# Patient Record
Sex: MALE | Race: WHITE | NOT HISPANIC OR LATINO | Employment: FULL TIME | URBAN - METROPOLITAN AREA
[De-identification: names, ages, dates, MRNs, and addresses within clinical notes are randomized per-mention and may not be internally consistent; named-entity substitution may affect disease eponyms.]

---

## 2023-09-09 ENCOUNTER — HOSPITAL ENCOUNTER (OUTPATIENT)
Dept: DATA CONVERSION | Facility: HOSPITAL | Age: 53
Discharge: HOME | End: 2023-09-09
Payer: COMMERCIAL

## 2023-09-09 DIAGNOSIS — I10 ESSENTIAL (PRIMARY) HYPERTENSION: ICD-10-CM

## 2023-09-09 LAB
ANION GAP SERPL CALCULATED.3IONS-SCNC: 11 MMOL/L (ref 0–19)
BILIRUB UR QL STRIP.AUTO: NEGATIVE
BUN SERPL-MCNC: 20 MG/DL (ref 8–25)
BUN/CREAT SERPL: 18.2 RATIO (ref 8–21)
CALCIUM SERPL-MCNC: 9.4 MG/DL (ref 8.5–10.4)
CHLORIDE SERPL-SCNC: 103 MMOL/L (ref 97–107)
CLARITY UR: CLEAR
CO2 SERPL-SCNC: 27 MMOL/L (ref 24–31)
COLOR UR: NORMAL
CREAT SERPL-MCNC: 1.1 MG/DL (ref 0.4–1.6)
GFR SERPL CREATININE-BSD FRML MDRD: 81 ML/MIN/1.73 M2
GLUCOSE SERPL-MCNC: 109 MG/DL (ref 65–99)
GLUCOSE UR STRIP.AUTO-MCNC: NEGATIVE MG/DL
HGB UR QL: NEGATIVE
KETONES UR QL STRIP.AUTO: NEGATIVE
LEUKOCYTE ESTERASE UR QL STRIP.AUTO: NEGATIVE
NITRITE UR QL STRIP.AUTO: NEGATIVE
PH UR STRIP.AUTO: 7 [PH] (ref 4.6–8)
POTASSIUM SERPL-SCNC: 4 MMOL/L (ref 3.4–5.1)
PROT UR STRIP.AUTO-MCNC: NEGATIVE MG/DL
REFLEX MICROSCOPIC (UA): NORMAL
SODIUM SERPL-SCNC: 141 MMOL/L (ref 133–145)
SP GR UR STRIP.AUTO: 1.01 (ref 1–1.03)
UROBILINOGEN UR QL STRIP.AUTO: NORMAL MG/DL (ref 0–1)

## 2023-11-01 PROBLEM — R73.9 BLOOD GLUCOSE ELEVATED: Status: ACTIVE | Noted: 2019-04-02

## 2023-11-01 PROBLEM — S83.412A SPRAIN OF MEDIAL COLLATERAL LIGAMENT OF LEFT KNEE: Status: ACTIVE | Noted: 2020-01-15

## 2023-11-01 PROBLEM — K42.9 UMBILICAL HERNIA WITHOUT OBSTRUCTION AND WITHOUT GANGRENE: Status: ACTIVE | Noted: 2023-11-01

## 2023-11-01 PROBLEM — R73.01 IMPAIRED FASTING GLUCOSE: Status: ACTIVE | Noted: 2019-04-02

## 2023-11-01 PROBLEM — K42.9 PERIUMBILICAL HERNIA: Status: ACTIVE | Noted: 2019-01-18

## 2023-11-01 PROBLEM — G47.33 OBSTRUCTIVE SLEEP APNEA: Status: ACTIVE | Noted: 2020-01-15

## 2023-11-01 PROBLEM — K43.9 VENTRAL HERNIA WITHOUT OBSTRUCTION OR GANGRENE: Status: ACTIVE | Noted: 2023-11-01

## 2023-11-01 PROBLEM — I05.9 MITRAL VALVE DISORDER: Status: ACTIVE | Noted: 2023-11-01

## 2023-11-01 PROBLEM — E11.9 DIABETES (MULTI): Status: ACTIVE | Noted: 2023-02-20

## 2023-11-01 PROBLEM — I10 ESSENTIAL (PRIMARY) HYPERTENSION: Status: ACTIVE | Noted: 2018-09-30

## 2023-11-01 RX ORDER — HYDROCHLOROTHIAZIDE 25 MG/1
25 TABLET ORAL DAILY
COMMUNITY
End: 2024-04-22

## 2023-11-01 RX ORDER — LISINOPRIL 10 MG/1
10 TABLET ORAL DAILY
COMMUNITY
End: 2024-01-18

## 2023-11-01 RX ORDER — NAPROXEN 500 MG/1
1 TABLET ORAL EVERY 12 HOURS
COMMUNITY
Start: 2019-09-28 | End: 2023-12-14 | Stop reason: ALTCHOICE

## 2023-11-02 ENCOUNTER — OFFICE VISIT (OUTPATIENT)
Dept: SURGERY | Facility: CLINIC | Age: 53
End: 2023-11-02
Payer: COMMERCIAL

## 2023-11-02 VITALS
HEART RATE: 93 BPM | BODY MASS INDEX: 23.62 KG/M2 | HEIGHT: 75 IN | SYSTOLIC BLOOD PRESSURE: 133 MMHG | DIASTOLIC BLOOD PRESSURE: 87 MMHG | WEIGHT: 190 LBS

## 2023-11-02 DIAGNOSIS — Z12.11 ENCOUNTER FOR SCREENING COLONOSCOPY: Primary | ICD-10-CM

## 2023-11-02 ASSESSMENT — LIFESTYLE VARIABLES
AUDIT-C TOTAL SCORE: 2
SKIP TO QUESTIONS 9-10: 1
HOW OFTEN DO YOU HAVE SIX OR MORE DRINKS ON ONE OCCASION: NEVER
HOW OFTEN DO YOU HAVE A DRINK CONTAINING ALCOHOL: 2-4 TIMES A MONTH
HOW MANY STANDARD DRINKS CONTAINING ALCOHOL DO YOU HAVE ON A TYPICAL DAY: 1 OR 2

## 2023-11-02 ASSESSMENT — PATIENT HEALTH QUESTIONNAIRE - PHQ9
1. LITTLE INTEREST OR PLEASURE IN DOING THINGS: NOT AT ALL
10. IF YOU CHECKED OFF ANY PROBLEMS, HOW DIFFICULT HAVE THESE PROBLEMS MADE IT FOR YOU TO DO YOUR WORK, TAKE CARE OF THINGS AT HOME, OR GET ALONG WITH OTHER PEOPLE: NOT DIFFICULT AT ALL
1. LITTLE INTEREST OR PLEASURE IN DOING THINGS: NOT AT ALL
SUM OF ALL RESPONSES TO PHQ9 QUESTIONS 1 & 2: 1
2. FEELING DOWN, DEPRESSED OR HOPELESS: NOT AT ALL
SUM OF ALL RESPONSES TO PHQ9 QUESTIONS 1 & 2: 0
2. FEELING DOWN, DEPRESSED OR HOPELESS: SEVERAL DAYS

## 2023-11-02 ASSESSMENT — ENCOUNTER SYMPTOMS
LOSS OF SENSATION IN FEET: 0
OCCASIONAL FEELINGS OF UNSTEADINESS: 0
DEPRESSION: 0

## 2023-11-02 ASSESSMENT — PAIN SCALES - GENERAL: PAINLEVEL: 0-NO PAIN

## 2023-11-02 NOTE — PROGRESS NOTES
"HPI presents today for a screening colonoscopy.   Denies:  any change in bowel habits, caliber of stool, abdominal pain or bloating, unintentional weight loss, blood in stool or on toilet tissue, family history of colon cancer or anticoagulant use.  He did have some bout of gastritis/colitis after a recent trip to [place]. No issues since his visit.     VITAL SIGNS:/87 (BP Location: Left arm, Patient Position: Sitting, BP Cuff Size: Adult)   Pulse 93   Ht 1.905 m (6' 3\")   Wt 86.2 kg (190 lb)   BMI 23.75 kg/m²     ALLERGIES: is allergic to seasonale (91) [levonorgestrel-ethinyl estrad].      ROS:  CONSTITUTIONAL: Negative for, fever, chills, fatigue, night sweats, weight gain, unexplained weight loss, loss of appetite, cold intolerance, heat intolerance. SKIN: Negative for: rash, changes in moles, hair loss. EYE: Negative for: change in vision, double vision, dry, red, itchy or watery eyes. HEENT: Negative for: headache, sore throat, ear pain, dental problems, tinnitus, hearing loss, trouble swallowing.  CARDIOLOGY: Negative for:  chest pain, palpitations, SOB. RESPIRATORY: Negative for: nausea, vomiting, SOB, dyspnea on exertion, cough, wheezing.  GI: Negative for:  nausea, vomiting, heartburn, melena, hematochezia, constipation, diarrhea.  UROLOGY: Negative for:  dysuria, hematuria, frequency, urgency. NEUROLOGY:  Negative for:  difficulty with balance, memory loss, confusion, seizures, paresthesias, numbness, weakness, tingling.  MUSCULOSKELETAL:  Negative for:  joint pain, joint swelling, back pain, leg pain.  ENDOCRINOLOGY:  Negative for:  thyroid disease, diabetes, fatigue, polyuria.  PSYCH:  Negative for:  hallucinations, jose roberto, depression, anxiety.        PHYSICAL EXAMINATION:    GENERAL:    General:  Appearance: well developed, well nourished.   NECK:   Findings: supple, no lymphadenopathy.  HEENT:  Head: normocephalic, atraumatic.  EYES:   Sclera: anicteric.  HEART:   Heart sounds: normal S1 " and S2. Murmurs: none.  LUNGS:  Auscultation: CTA bilaterally.  CHEST:   Chest wall: non tender, no masses.  ABDOMEN:   General: soft, bowel sounds: normoactive. Tenderness: none. Rebound tenderness: none. Hernia: absent.  MUSCULOSKELETAL:   No gross abnormalities.        Screening colonoscopy.   Prep given and all questions answered.  He will hold his BP medications the morning of his procedure.  He will hold all NAID use 5 days prior to his procedure.   +

## 2023-12-14 ENCOUNTER — HOSPITAL ENCOUNTER (OUTPATIENT)
Dept: GASTROENTEROLOGY | Facility: HOSPITAL | Age: 53
Discharge: HOME | End: 2023-12-14
Payer: COMMERCIAL

## 2023-12-14 VITALS
RESPIRATION RATE: 16 BRPM | HEIGHT: 74 IN | SYSTOLIC BLOOD PRESSURE: 109 MMHG | TEMPERATURE: 96.8 F | WEIGHT: 187.39 LBS | OXYGEN SATURATION: 99 % | HEART RATE: 59 BPM | BODY MASS INDEX: 24.05 KG/M2 | DIASTOLIC BLOOD PRESSURE: 66 MMHG

## 2023-12-14 DIAGNOSIS — Z12.11 ENCOUNTER FOR SCREENING COLONOSCOPY: ICD-10-CM

## 2023-12-14 PROCEDURE — 7100000010 HC PHASE TWO TIME - EACH INCREMENTAL 1 MINUTE

## 2023-12-14 PROCEDURE — 45378 DIAGNOSTIC COLONOSCOPY: CPT | Performed by: COLON & RECTAL SURGERY

## 2023-12-14 PROCEDURE — 2500000004 HC RX 250 GENERAL PHARMACY W/ HCPCS (ALT 636 FOR OP/ED): Performed by: COLON & RECTAL SURGERY

## 2023-12-14 PROCEDURE — 7100000009 HC PHASE TWO TIME - INITIAL BASE CHARGE

## 2023-12-14 PROCEDURE — 99152 MOD SED SAME PHYS/QHP 5/>YRS: CPT | Mod: 59 | Performed by: COLON & RECTAL SURGERY

## 2023-12-14 PROCEDURE — 99153 MOD SED SAME PHYS/QHP EA: CPT | Performed by: COLON & RECTAL SURGERY

## 2023-12-14 RX ORDER — LIDOCAINE HYDROCHLORIDE 10 MG/ML
0.1 INJECTION INFILTRATION; PERINEURAL ONCE
Status: DISCONTINUED | OUTPATIENT
Start: 2023-12-14 | End: 2023-12-14

## 2023-12-14 RX ORDER — LABETALOL HYDROCHLORIDE 5 MG/ML
10 INJECTION, SOLUTION INTRAVENOUS ONCE AS NEEDED
Status: DISCONTINUED | OUTPATIENT
Start: 2023-12-14 | End: 2023-12-14

## 2023-12-14 RX ORDER — MIDAZOLAM HYDROCHLORIDE 1 MG/ML
INJECTION, SOLUTION INTRAMUSCULAR; INTRAVENOUS AS NEEDED
Status: COMPLETED | OUTPATIENT
Start: 2023-12-14 | End: 2023-12-14

## 2023-12-14 RX ORDER — FAMOTIDINE 20 MG/1
20 TABLET, FILM COATED ORAL ONCE
Status: DISCONTINUED | OUTPATIENT
Start: 2023-12-14 | End: 2023-12-14

## 2023-12-14 RX ORDER — ALBUTEROL SULFATE 0.83 MG/ML
2.5 SOLUTION RESPIRATORY (INHALATION) ONCE AS NEEDED
Status: DISCONTINUED | OUTPATIENT
Start: 2023-12-14 | End: 2023-12-14

## 2023-12-14 RX ORDER — MEPERIDINE HYDROCHLORIDE 50 MG/ML
INJECTION INTRAMUSCULAR; INTRAVENOUS; SUBCUTANEOUS AS NEEDED
Status: COMPLETED | OUTPATIENT
Start: 2023-12-14 | End: 2023-12-14

## 2023-12-14 RX ORDER — ALBUTEROL SULFATE 0.83 MG/ML
2.5 SOLUTION RESPIRATORY (INHALATION) ONCE
Status: DISCONTINUED | OUTPATIENT
Start: 2023-12-14 | End: 2023-12-14

## 2023-12-14 RX ORDER — HYDRALAZINE HYDROCHLORIDE 20 MG/ML
10 INJECTION INTRAMUSCULAR; INTRAVENOUS EVERY 30 MIN PRN
Status: DISCONTINUED | OUTPATIENT
Start: 2023-12-14 | End: 2023-12-14

## 2023-12-14 RX ORDER — ONDANSETRON HYDROCHLORIDE 2 MG/ML
4 INJECTION, SOLUTION INTRAVENOUS ONCE AS NEEDED
Status: DISCONTINUED | OUTPATIENT
Start: 2023-12-14 | End: 2023-12-14

## 2023-12-14 RX ORDER — MEPERIDINE HYDROCHLORIDE 25 MG/ML
INJECTION INTRAMUSCULAR; INTRAVENOUS; SUBCUTANEOUS AS NEEDED
Status: COMPLETED | OUTPATIENT
Start: 2023-12-14 | End: 2023-12-14

## 2023-12-14 RX ORDER — DIPHENHYDRAMINE HYDROCHLORIDE 50 MG/ML
12.5 INJECTION INTRAMUSCULAR; INTRAVENOUS ONCE AS NEEDED
Status: DISCONTINUED | OUTPATIENT
Start: 2023-12-14 | End: 2023-12-14

## 2023-12-14 RX ORDER — METOCLOPRAMIDE 10 MG/1
10 TABLET ORAL ONCE
Status: DISCONTINUED | OUTPATIENT
Start: 2023-12-14 | End: 2023-12-14

## 2023-12-14 RX ADMIN — MIDAZOLAM 1 MG: 1 INJECTION INTRAMUSCULAR; INTRAVENOUS at 10:38

## 2023-12-14 RX ADMIN — MIDAZOLAM 1 MG: 1 INJECTION INTRAMUSCULAR; INTRAVENOUS at 10:03

## 2023-12-14 RX ADMIN — MIDAZOLAM 2 MG: 1 INJECTION INTRAMUSCULAR; INTRAVENOUS at 09:56

## 2023-12-14 RX ADMIN — MEPERIDINE HYDROCHLORIDE 25 MG: 25 INJECTION INTRAMUSCULAR; INTRAVENOUS; SUBCUTANEOUS at 10:12

## 2023-12-14 RX ADMIN — MEPERIDINE HYDROCHLORIDE 50 MG: 50 INJECTION INTRAMUSCULAR; INTRAVENOUS; SUBCUTANEOUS at 09:56

## 2023-12-14 ASSESSMENT — PAIN SCALES - GENERAL
PAINLEVEL_OUTOF10: 0 - NO PAIN

## 2023-12-14 ASSESSMENT — PAIN - FUNCTIONAL ASSESSMENT
PAIN_FUNCTIONAL_ASSESSMENT: 0-10

## 2023-12-14 NOTE — POST-PROCEDURE NOTE
Patient brought over from ENDO via cart and RN. Patient awake, denies pain, belly soft and nondistended. Patient given cookies and coffee, wife called to pick him up out at the main entrance.

## 2023-12-14 NOTE — H&P
"History Of Present Illness  Kevon Gonzales is a 53 y.o. male presenting with for his first screening colonoscopy.  He has no symptoms.  See note of 11/2/2023     Past Medical History  Past Medical History:   Diagnosis Date    Hypertension     Sleep apnea        Surgical History  Past Surgical History:   Procedure Laterality Date    NOSE SURGERY          Social History  He reports that he has never smoked. He has never used smokeless tobacco. He reports current alcohol use. He reports that he does not use drugs.    Family History  Family History   Problem Relation Name Age of Onset    Heart disease Mother      Mental illness Father      Heart disease Mother's Brother      Prostate cancer Mother's Brother      Cancer Maternal Grandmother      Heart attack Maternal Grandfather          Allergies  Seasonale (91) [levonorgestrel-ethinyl estrad]    Review of Systems     Physical Exam     Last Recorded Vitals  Blood pressure 117/88, pulse 82, temperature 36.2 °C (97.2 °F), temperature source Temporal, resp. rate 16, height 1.88 m (6' 2\"), weight 85 kg (187 lb 6.3 oz), SpO2 99 %.    Relevant Results        None     Assessment/Plan   Active Problems:  There are no active Hospital Problems.      Screening colonoscopy informed consent obtained       I spent 10 minutes in the professional and overall care of this patient.      Corbin Jones MD    "

## 2023-12-14 NOTE — POST-PROCEDURE NOTE
Discharge instructions reviewed with patient, he verbalizes understanding. Tolerating PO well. Patient is having another cup of coffee then will get dressed.

## 2024-01-18 DIAGNOSIS — I10 ESSENTIAL (PRIMARY) HYPERTENSION: ICD-10-CM

## 2024-01-18 RX ORDER — LISINOPRIL 10 MG/1
10 TABLET ORAL DAILY
Qty: 90 TABLET | Refills: 2 | Status: SHIPPED | OUTPATIENT
Start: 2024-01-18

## 2024-02-21 ASSESSMENT — PROMIS GLOBAL HEALTH SCALE
RATE_GENERAL_HEALTH: GOOD
RATE_QUALITY_OF_LIFE: GOOD
RATE_PHYSICAL_HEALTH: GOOD
CARRYOUT_SOCIAL_ACTIVITIES: VERY GOOD
CARRYOUT_PHYSICAL_ACTIVITIES: COMPLETELY
RATE_AVERAGE_FATIGUE: MODERATE
RATE_SOCIAL_SATISFACTION: GOOD
EMOTIONAL_PROBLEMS: SOMETIMES
RATE_AVERAGE_PAIN: 2
RATE_MENTAL_HEALTH: GOOD

## 2024-02-22 ENCOUNTER — OFFICE VISIT (OUTPATIENT)
Dept: PRIMARY CARE | Facility: CLINIC | Age: 54
End: 2024-02-22
Payer: COMMERCIAL

## 2024-02-22 ENCOUNTER — TELEPHONE (OUTPATIENT)
Dept: PRIMARY CARE | Facility: CLINIC | Age: 54
End: 2024-02-22

## 2024-02-22 VITALS
OXYGEN SATURATION: 96 % | WEIGHT: 180 LBS | SYSTOLIC BLOOD PRESSURE: 110 MMHG | BODY MASS INDEX: 23.1 KG/M2 | HEART RATE: 52 BPM | RESPIRATION RATE: 16 BRPM | DIASTOLIC BLOOD PRESSURE: 64 MMHG | HEIGHT: 74 IN

## 2024-02-22 DIAGNOSIS — Z00.00 WELL ADULT EXAM: ICD-10-CM

## 2024-02-22 DIAGNOSIS — Z00.00 ANNUAL PHYSICAL EXAM: ICD-10-CM

## 2024-02-22 DIAGNOSIS — Z79.4 TYPE 2 DIABETES MELLITUS WITHOUT COMPLICATION, WITH LONG-TERM CURRENT USE OF INSULIN (MULTI): ICD-10-CM

## 2024-02-22 DIAGNOSIS — R73.01 IMPAIRED FASTING GLUCOSE: Primary | ICD-10-CM

## 2024-02-22 DIAGNOSIS — E11.9 TYPE 2 DIABETES MELLITUS WITHOUT COMPLICATION, WITH LONG-TERM CURRENT USE OF INSULIN (MULTI): ICD-10-CM

## 2024-02-22 DIAGNOSIS — I10 ESSENTIAL (PRIMARY) HYPERTENSION: ICD-10-CM

## 2024-02-22 DIAGNOSIS — G47.33 OBSTRUCTIVE SLEEP APNEA: ICD-10-CM

## 2024-02-22 PROCEDURE — 3078F DIAST BP <80 MM HG: CPT | Performed by: FAMILY MEDICINE

## 2024-02-22 PROCEDURE — 3074F SYST BP LT 130 MM HG: CPT | Performed by: FAMILY MEDICINE

## 2024-02-22 PROCEDURE — 1036F TOBACCO NON-USER: CPT | Performed by: FAMILY MEDICINE

## 2024-02-22 PROCEDURE — 99396 PREV VISIT EST AGE 40-64: CPT | Performed by: FAMILY MEDICINE

## 2024-02-22 PROCEDURE — 4010F ACE/ARB THERAPY RXD/TAKEN: CPT | Performed by: FAMILY MEDICINE

## 2024-02-22 PROCEDURE — 99212 OFFICE O/P EST SF 10 MIN: CPT | Performed by: FAMILY MEDICINE

## 2024-02-22 ASSESSMENT — PAIN SCALES - GENERAL: PAINLEVEL: 0-NO PAIN

## 2024-02-22 ASSESSMENT — PATIENT HEALTH QUESTIONNAIRE - PHQ9
SUM OF ALL RESPONSES TO PHQ9 QUESTIONS 1 AND 2: 0
1. LITTLE INTEREST OR PLEASURE IN DOING THINGS: NOT AT ALL
2. FEELING DOWN, DEPRESSED OR HOPELESS: NOT AT ALL

## 2024-02-22 ASSESSMENT — ENCOUNTER SYMPTOMS
DEPRESSION: 0
OCCASIONAL FEELINGS OF UNSTEADINESS: 0
LOSS OF SENSATION IN FEET: 0

## 2024-02-22 NOTE — ASSESSMENT & PLAN NOTE
Will get fasting blood work.  He is to cut back on carbohydrates in his diet.  If blood work stable recheck in 6 months.

## 2024-02-22 NOTE — PROGRESS NOTES
"Subjective   Patient ID: Jean Carlos Gonzales is a 53 y.o. male who presents for Annual Exam.    HPI   1. JEAN CARLOS is seen for for his comprehensive physical exam. PMH, PSH, family history and social history were reviewed and updated.  He moved to [place] from [place] in 2/17 with his wife and  son. He is originally from [place]   Colonoscopy in 12/23 by Dr. Gandhi was normal.     2. JEAN CARLOS is here also for follow up of HTN.  He is on lisinopril 10 mg and HCTZ 25 mg.  HTN does not run in his family as far as he knows. He follows a good diet, reduces his salt intake and exercises intermittently. He had a cardiology workup by Dr. Darden in 7/19  for a  slightly abnormal EKG when being evaluated preoperatively.  His cardiac workup was negative.      3. He is also here for follow-up of obstructive sleep apnea.  He was diagnosed with this in 2020. He is now using a CPAP machine.Uses it every night and finds to be beneficial in terms of having him sleep better and feels less fatigued during the day.      4.   He is also here for follow-up of impaired fasting glucose.  He is on no medication.     Review of Systems  Denies headache, blurred vision, chest pain, shortness of breath, nausea or vomiting, change in bowel habits or leg pain or swelling.    Objective   /64 (BP Location: Left arm, Patient Position: Sitting, BP Cuff Size: Large adult)   Pulse 52   Resp 16   Ht 1.88 m (6' 2\")   Wt 81.6 kg (180 lb)   SpO2 96%   BMI 23.11 kg/m²     Physical Exam  General appearance: Vital signs have been reviewed.  Comfortable.  Well-nourished and well-developed.He is alert and oriented x3 and appears his stated age.The patient is cooperative with exam.  Head: Hair pattern reveals a normal pattern for patient's age and face shows no abnormalities.  Eyes: PERRLA x2, EOMI x2, conjunctive a and sclera are clear.  Ears: External bilateral ears with normal helix, tragus and earlobe.Bilateral canals are normal.Bilateral tympanic " membranes are pearly gray and landmarks are well visualized.  Nose: Nasal mucosa both nostrils reveals no polyps, ulcerations or lesions.  Throat:Teeth are in good repair.  Posterior pharynx reveals no abnormalities.  Neck: Supple without lymphadenopathy, thyromegaly or carotid bruits.  Lungs:Clear to auscultation bilaterally with no wheezes, rales or rhonchi.  Cardiovascular: RRR without MRG.No carotid bruits.  Extremities without edema and pulses are intact.  Abdomen: Soft, NT, no masses, no hepatosplenomegaly.  Genitalia: No testicular masses.  No evidence of renal hernia.Nontender.  Rectal: No masses.  Prostate is normal in size and shape with no nodules. It is nontender.  Musculoskeletal: 5/5 and equal strength in bilateral upper and lower extremities.  Skin: Good turgor and without rashes.  Neurological: Good overall strength and no focal deficits.  Cranial nerves II through XII are grossly intact.  Psychiatric: Patient has appropriate judgment with good insight.  Mood is appropriate.    Assessment/Plan   Problem List Items Addressed This Visit             ICD-10-CM       High    Essential (primary) hypertension I10     Continue lisinopril and hydrochlorothiazide.  Will get blood work and I will notify him results.  If stable return in 6 months for recheck.         Relevant Orders    Albumin , Urine Random    CBC and Auto Differential    Comprehensive Metabolic Panel    Lipid Panel    Urinalysis with Reflex Microscopic    Impaired fasting glucose - Primary R73.01     Will get fasting blood work.  He is to cut back on carbohydrates in his diet.  If blood work stable recheck in 6 months.         Obstructive sleep apnea G47.33     Continue using CPAP.         Well adult exam Z00.00     Anticipate guidance given.            Medium    Diabetes (CMS/HCC) E11.9    Relevant Orders    Albumin , Urine Random    CBC and Auto Differential    Comprehensive Metabolic Panel    Hemoglobin A1C    Lipid Panel    Urinalysis with  Reflex Microscopic     Other Visit Diagnoses         Codes    Annual physical exam     Z00.00    Relevant Orders    Albumin , Urine Random    CBC and Auto Differential    Comprehensive Metabolic Panel    Lipid Panel    Urinalysis with Reflex Microscopic

## 2024-02-22 NOTE — ASSESSMENT & PLAN NOTE
Continue lisinopril and hydrochlorothiazide.  Will get blood work and I will notify him results.  If stable return in 6 months for recheck.

## 2024-03-02 ENCOUNTER — LAB (OUTPATIENT)
Dept: LAB | Facility: LAB | Age: 54
End: 2024-03-02
Payer: COMMERCIAL

## 2024-03-02 DIAGNOSIS — I10 ESSENTIAL (PRIMARY) HYPERTENSION: ICD-10-CM

## 2024-03-02 DIAGNOSIS — E11.9 TYPE 2 DIABETES MELLITUS WITHOUT COMPLICATION, WITH LONG-TERM CURRENT USE OF INSULIN (MULTI): ICD-10-CM

## 2024-03-02 DIAGNOSIS — Z00.00 ANNUAL PHYSICAL EXAM: ICD-10-CM

## 2024-03-02 DIAGNOSIS — Z79.4 TYPE 2 DIABETES MELLITUS WITHOUT COMPLICATION, WITH LONG-TERM CURRENT USE OF INSULIN (MULTI): ICD-10-CM

## 2024-03-02 LAB
ALBUMIN SERPL-MCNC: 4.4 G/DL (ref 3.5–5)
ALP BLD-CCNC: 44 U/L (ref 35–125)
ALT SERPL-CCNC: 22 U/L (ref 5–40)
ANION GAP SERPL CALC-SCNC: 12 MMOL/L
APPEARANCE UR: CLEAR
AST SERPL-CCNC: 17 U/L (ref 5–40)
BASOPHILS # BLD AUTO: 0.02 X10*3/UL (ref 0–0.1)
BASOPHILS NFR BLD AUTO: 0.5 %
BILIRUB SERPL-MCNC: 0.3 MG/DL (ref 0.1–1.2)
BILIRUB UR STRIP.AUTO-MCNC: NEGATIVE MG/DL
BUN SERPL-MCNC: 20 MG/DL (ref 8–25)
CALCIUM SERPL-MCNC: 9.5 MG/DL (ref 8.5–10.4)
CHLORIDE SERPL-SCNC: 104 MMOL/L (ref 97–107)
CHOLEST SERPL-MCNC: 172 MG/DL (ref 133–200)
CHOLEST/HDLC SERPL: 4 {RATIO}
CO2 SERPL-SCNC: 27 MMOL/L (ref 24–31)
COLOR UR: NORMAL
CREAT SERPL-MCNC: 1 MG/DL (ref 0.4–1.6)
CREAT UR-MCNC: 120.3 MG/DL
EGFRCR SERPLBLD CKD-EPI 2021: >90 ML/MIN/1.73M*2
EOSINOPHIL # BLD AUTO: 0.07 X10*3/UL (ref 0–0.7)
EOSINOPHIL NFR BLD AUTO: 1.6 %
ERYTHROCYTE [DISTWIDTH] IN BLOOD BY AUTOMATED COUNT: 13.2 % (ref 11.5–14.5)
EST. AVERAGE GLUCOSE BLD GHB EST-MCNC: 114 MG/DL
GLUCOSE SERPL-MCNC: 103 MG/DL (ref 65–99)
GLUCOSE UR STRIP.AUTO-MCNC: NORMAL MG/DL
HBA1C MFR BLD: 5.6 %
HCT VFR BLD AUTO: 43.9 % (ref 41–52)
HDLC SERPL-MCNC: 43 MG/DL
HGB BLD-MCNC: 14.6 G/DL (ref 13.5–17.5)
IMM GRANULOCYTES # BLD AUTO: 0 X10*3/UL (ref 0–0.7)
IMM GRANULOCYTES NFR BLD AUTO: 0 % (ref 0–0.9)
KETONES UR STRIP.AUTO-MCNC: NEGATIVE MG/DL
LDLC SERPL CALC-MCNC: 114 MG/DL (ref 65–130)
LEUKOCYTE ESTERASE UR QL STRIP.AUTO: NEGATIVE
LYMPHOCYTES # BLD AUTO: 2.11 X10*3/UL (ref 1.2–4.8)
LYMPHOCYTES NFR BLD AUTO: 47.8 %
MCH RBC QN AUTO: 28.3 PG (ref 26–34)
MCHC RBC AUTO-ENTMCNC: 33.3 G/DL (ref 32–36)
MCV RBC AUTO: 85 FL (ref 80–100)
MICROALBUMIN UR-MCNC: <12 MG/L (ref 0–23)
MICROALBUMIN/CREAT UR: NORMAL MG/G{CREAT}
MONOCYTES # BLD AUTO: 0.34 X10*3/UL (ref 0.1–1)
MONOCYTES NFR BLD AUTO: 7.7 %
NEUTROPHILS # BLD AUTO: 1.87 X10*3/UL (ref 1.2–7.7)
NEUTROPHILS NFR BLD AUTO: 42.4 %
NITRITE UR QL STRIP.AUTO: NEGATIVE
NRBC BLD-RTO: 0 /100 WBCS (ref 0–0)
PH UR STRIP.AUTO: 5.5 [PH]
PLATELET # BLD AUTO: 229 X10*3/UL (ref 150–450)
POTASSIUM SERPL-SCNC: 4.1 MMOL/L (ref 3.4–5.1)
PROT SERPL-MCNC: 6.7 G/DL (ref 5.9–7.9)
PROT UR STRIP.AUTO-MCNC: NEGATIVE MG/DL
RBC # BLD AUTO: 5.16 X10*6/UL (ref 4.5–5.9)
RBC # UR STRIP.AUTO: NEGATIVE /UL
SODIUM SERPL-SCNC: 143 MMOL/L (ref 133–145)
SP GR UR STRIP.AUTO: 1.02
TRIGL SERPL-MCNC: 76 MG/DL (ref 40–150)
UROBILINOGEN UR STRIP.AUTO-MCNC: NORMAL MG/DL
WBC # BLD AUTO: 4.4 X10*3/UL (ref 4.4–11.3)

## 2024-03-02 PROCEDURE — 81003 URINALYSIS AUTO W/O SCOPE: CPT

## 2024-03-02 PROCEDURE — 80061 LIPID PANEL: CPT

## 2024-03-02 PROCEDURE — 82043 UR ALBUMIN QUANTITATIVE: CPT

## 2024-03-02 PROCEDURE — 36415 COLL VENOUS BLD VENIPUNCTURE: CPT

## 2024-03-02 PROCEDURE — 82570 ASSAY OF URINE CREATININE: CPT

## 2024-03-02 PROCEDURE — 85025 COMPLETE CBC W/AUTO DIFF WBC: CPT

## 2024-03-02 PROCEDURE — 80053 COMPREHEN METABOLIC PANEL: CPT

## 2024-03-02 PROCEDURE — 83036 HEMOGLOBIN GLYCOSYLATED A1C: CPT

## 2024-04-20 DIAGNOSIS — I10 ESSENTIAL (PRIMARY) HYPERTENSION: ICD-10-CM

## 2024-04-22 RX ORDER — HYDROCHLOROTHIAZIDE 25 MG/1
25 TABLET ORAL DAILY
Qty: 90 TABLET | Refills: 3 | Status: SHIPPED | OUTPATIENT
Start: 2024-04-22

## 2024-08-20 ENCOUNTER — APPOINTMENT (OUTPATIENT)
Dept: PRIMARY CARE | Facility: CLINIC | Age: 54
End: 2024-08-20
Payer: COMMERCIAL

## 2024-08-20 ENCOUNTER — TELEPHONE (OUTPATIENT)
Dept: PRIMARY CARE | Facility: CLINIC | Age: 54
End: 2024-08-20

## 2024-08-20 VITALS
RESPIRATION RATE: 16 BRPM | HEART RATE: 62 BPM | BODY MASS INDEX: 25.29 KG/M2 | OXYGEN SATURATION: 98 % | SYSTOLIC BLOOD PRESSURE: 118 MMHG | WEIGHT: 197 LBS | DIASTOLIC BLOOD PRESSURE: 72 MMHG

## 2024-08-20 DIAGNOSIS — R73.01 IMPAIRED FASTING GLUCOSE: ICD-10-CM

## 2024-08-20 DIAGNOSIS — Z79.4 TYPE 2 DIABETES MELLITUS WITHOUT COMPLICATION, WITH LONG-TERM CURRENT USE OF INSULIN (MULTI): ICD-10-CM

## 2024-08-20 DIAGNOSIS — I10 ESSENTIAL (PRIMARY) HYPERTENSION: ICD-10-CM

## 2024-08-20 DIAGNOSIS — Z00.00 ANNUAL PHYSICAL EXAM: Primary | ICD-10-CM

## 2024-08-20 DIAGNOSIS — G47.33 OBSTRUCTIVE SLEEP APNEA: Primary | ICD-10-CM

## 2024-08-20 DIAGNOSIS — E11.9 TYPE 2 DIABETES MELLITUS WITHOUT COMPLICATION, WITH LONG-TERM CURRENT USE OF INSULIN (MULTI): ICD-10-CM

## 2024-08-20 PROCEDURE — 4010F ACE/ARB THERAPY RXD/TAKEN: CPT | Performed by: FAMILY MEDICINE

## 2024-08-20 PROCEDURE — 1036F TOBACCO NON-USER: CPT | Performed by: FAMILY MEDICINE

## 2024-08-20 PROCEDURE — 3062F POS MACROALBUMINURIA REV: CPT | Performed by: FAMILY MEDICINE

## 2024-08-20 PROCEDURE — 99213 OFFICE O/P EST LOW 20 MIN: CPT | Performed by: FAMILY MEDICINE

## 2024-08-20 PROCEDURE — 3049F LDL-C 100-129 MG/DL: CPT | Performed by: FAMILY MEDICINE

## 2024-08-20 PROCEDURE — 3078F DIAST BP <80 MM HG: CPT | Performed by: FAMILY MEDICINE

## 2024-08-20 PROCEDURE — 3044F HG A1C LEVEL LT 7.0%: CPT | Performed by: FAMILY MEDICINE

## 2024-08-20 PROCEDURE — 3074F SYST BP LT 130 MM HG: CPT | Performed by: FAMILY MEDICINE

## 2024-08-20 ASSESSMENT — PAIN SCALES - GENERAL: PAINLEVEL: 0-NO PAIN

## 2024-08-20 ASSESSMENT — PATIENT HEALTH QUESTIONNAIRE - PHQ9
2. FEELING DOWN, DEPRESSED OR HOPELESS: NOT AT ALL
SUM OF ALL RESPONSES TO PHQ9 QUESTIONS 1 AND 2: 0
1. LITTLE INTEREST OR PLEASURE IN DOING THINGS: NOT AT ALL

## 2024-08-20 ASSESSMENT — ENCOUNTER SYMPTOMS
DEPRESSION: 0
LOSS OF SENSATION IN FEET: 0
OCCASIONAL FEELINGS OF UNSTEADINESS: 0

## 2024-08-20 NOTE — ASSESSMENT & PLAN NOTE
Continue lisinopril daily.  Continue exercise and cutting back on salt in diet.  Recheck in 6 months at [facility].

## 2024-08-20 NOTE — PROGRESS NOTES
Subjective   Patient ID: Jean Carlos Gonzales is a 54 y.o. male who presents for Hypertension (Patient is here for a HTN check).    HPI   1. JEAN CARLOS is here for follow up of HTN.  He is on lisinopril 10 mg and HCTZ 25 mg.  HTN does not run in his family as far as he knows. He follows a good diet, reduces his salt intake and exercises intermittently. He had a cardiology workup by Dr. Darden in 7/19  for a  slightly abnormal EKG when being evaluated preoperatively.  His cardiac workup was negative.      2. He is also here for follow-up of obstructive sleep apnea.  He was diagnosed with this in 2020. He is now using a CPAP machine.Uses it every night and finds to be beneficial in terms of having him sleep better and feels less fatigued during the day.      3.   He is also here for follow-up of impaired fasting glucose.  He is on no medication  Review of Systems  Denies headache, blurred vision, chest pain, shortness of breath, nausea or vomiting, change in bowel habits or leg pain or swelling.    Objective   /72 (BP Location: Left arm, Patient Position: Sitting, BP Cuff Size: Large adult)   Pulse 62   Resp 16   Wt 89.4 kg (197 lb)   SpO2 98%   BMI 25.29 kg/m²     Physical Exam  Vitals and nurse's notes reviewed  General: no acute distress  HEENT: Normal  Neck: Supple  Lungs: Clear  Cardio: RRR w/o murmur  Extremities: No edema, no calf tenderness  Neuro: Alert and oriented with no focal deficits    Assessment/Plan   Problem List Items Addressed This Visit             ICD-10-CM       High    Essential (primary) hypertension I10     Continue lisinopril daily.  Continue exercise and cutting back on salt in diet.  Recheck in 6 months at [facility].         Relevant Orders    Basic metabolic panel    Impaired fasting glucose R73.01     Will return for fasting blood work and A1c and I will notify him results.  If stable keep off medication and recheck in 6 months at [facility].         Relevant Orders    Hemoglobin A1c    Obstructive  sleep apnea - Primary G47.33     Continue CPAP therapy.  Recheck in 6 months at [facility].

## 2024-08-20 NOTE — ASSESSMENT & PLAN NOTE
Will return for fasting blood work and A1c and I will notify him results.  If stable keep off medication and recheck in 6 months at [facility].

## 2024-10-16 DIAGNOSIS — I10 ESSENTIAL (PRIMARY) HYPERTENSION: ICD-10-CM

## 2024-10-16 RX ORDER — LISINOPRIL 10 MG/1
10 TABLET ORAL DAILY
Qty: 90 TABLET | Refills: 2 | Status: SHIPPED | OUTPATIENT
Start: 2024-10-16

## 2025-03-29 ASSESSMENT — PROMIS GLOBAL HEALTH SCALE
RATE_QUALITY_OF_LIFE: GOOD
RATE_AVERAGE_FATIGUE: MODERATE
CARRYOUT_PHYSICAL_ACTIVITIES: COMPLETELY
RATE_SOCIAL_SATISFACTION: FAIR
EMOTIONAL_PROBLEMS: SOMETIMES
RATE_AVERAGE_PAIN: 1
RATE_PHYSICAL_HEALTH: GOOD
RATE_MENTAL_HEALTH: GOOD
RATE_GENERAL_HEALTH: GOOD
CARRYOUT_SOCIAL_ACTIVITIES: GOOD

## 2025-04-01 ENCOUNTER — TELEPHONE (OUTPATIENT)
Dept: PRIMARY CARE | Facility: CLINIC | Age: 55
End: 2025-04-01

## 2025-04-01 ENCOUNTER — APPOINTMENT (OUTPATIENT)
Dept: PRIMARY CARE | Facility: CLINIC | Age: 55
End: 2025-04-01
Payer: COMMERCIAL

## 2025-04-01 VITALS
BODY MASS INDEX: 24.37 KG/M2 | WEIGHT: 196 LBS | HEART RATE: 67 BPM | RESPIRATION RATE: 18 BRPM | OXYGEN SATURATION: 99 % | DIASTOLIC BLOOD PRESSURE: 74 MMHG | SYSTOLIC BLOOD PRESSURE: 130 MMHG | HEIGHT: 75 IN

## 2025-04-01 DIAGNOSIS — I10 ESSENTIAL (PRIMARY) HYPERTENSION: ICD-10-CM

## 2025-04-01 DIAGNOSIS — I10 ESSENTIAL (PRIMARY) HYPERTENSION: Primary | ICD-10-CM

## 2025-04-01 DIAGNOSIS — Z00.00 WELL ADULT EXAM: ICD-10-CM

## 2025-04-01 DIAGNOSIS — G47.33 OBSTRUCTIVE SLEEP APNEA: ICD-10-CM

## 2025-04-01 PROBLEM — R73.01 IMPAIRED FASTING GLUCOSE: Status: RESOLVED | Noted: 2019-04-02 | Resolved: 2025-04-01

## 2025-04-01 PROBLEM — R73.9 BLOOD GLUCOSE ELEVATED: Status: RESOLVED | Noted: 2019-04-02 | Resolved: 2025-04-01

## 2025-04-01 PROBLEM — E11.9 DIABETES (MULTI): Status: RESOLVED | Noted: 2023-02-20 | Resolved: 2025-04-01

## 2025-04-01 PROCEDURE — 99396 PREV VISIT EST AGE 40-64: CPT | Performed by: FAMILY MEDICINE

## 2025-04-01 PROCEDURE — 1036F TOBACCO NON-USER: CPT | Performed by: FAMILY MEDICINE

## 2025-04-01 PROCEDURE — 3078F DIAST BP <80 MM HG: CPT | Performed by: FAMILY MEDICINE

## 2025-04-01 PROCEDURE — 3075F SYST BP GE 130 - 139MM HG: CPT | Performed by: FAMILY MEDICINE

## 2025-04-01 PROCEDURE — 4010F ACE/ARB THERAPY RXD/TAKEN: CPT | Performed by: FAMILY MEDICINE

## 2025-04-01 PROCEDURE — 3008F BODY MASS INDEX DOCD: CPT | Performed by: FAMILY MEDICINE

## 2025-04-01 ASSESSMENT — PAIN SCALES - GENERAL: PAINLEVEL_OUTOF10: 0-NO PAIN

## 2025-04-01 ASSESSMENT — PATIENT HEALTH QUESTIONNAIRE - PHQ9
1. LITTLE INTEREST OR PLEASURE IN DOING THINGS: NOT AT ALL
2. FEELING DOWN, DEPRESSED OR HOPELESS: NOT AT ALL
SUM OF ALL RESPONSES TO PHQ9 QUESTIONS 1 AND 2: 0

## 2025-04-01 NOTE — ASSESSMENT & PLAN NOTE
Anticipatory guidance given.  He has had inguinal hernia symptoms over the past few years and now he is ready for referral.  He is going to call me when he wants to get this sent in.  Will refer to either Dr. Isabel or  Dr. Power.

## 2025-04-01 NOTE — ASSESSMENT & PLAN NOTE
Continue lisinopril and hydrochlorothiazide.  Will return for fasting [facility] blood work including CMP.  If stable continue current medication recheck with me in 6 months.

## 2025-04-01 NOTE — PROGRESS NOTES
"Subjective   Patient ID: Jean Carlos Gonzales is a 54 y.o. male who presents for Annual Exam (Patient is here for his annual wellness exam, no blood work done. Patient refused the pneumococcal vaccine ).    HPI   1. JEAN CARLOS is seen for for his comprehensive physical exam. PMH, PSH, family history and social history were reviewed and updated.  He moved to [place] from [place] in 2/17 with his wife and  son. He is originally from [place]   Colonoscopy in 12/23 by Dr. Gandhi was normal.     2. JEAN CARLOS is here also for follow up of HTN.  He is on lisinopril 10 mg and HCTZ 25 mg.  HTN does not run in his family as far as he knows. He follows a good diet, reduces his salt intake and exercises intermittently. He had a cardiology workup by Dr. Darden in 7/19  for a  slightly abnormal EKG when being evaluated preoperatively.  His cardiac workup was negative.      3. He is also here for follow-up of obstructive sleep apnea.  He was diagnosed with this in 2020. He is now using a CPAP machine.Uses it every night and finds to be beneficial in terms of having him sleep better and feels less fatigued during the day.      4.   He is also here for follow-up of impaired fasting glucose.  He is on no medication.     Review of Systems  Denies headache, blurred vision, chest pain, shortness of breath, nausea or vomiting, change in bowel habits or leg pain or swelling.    Objective   /74 (BP Location: Left arm, Patient Position: Sitting, BP Cuff Size: Large adult)   Pulse 67   Resp 18   Ht 1.892 m (6' 2.5\")   Wt 88.9 kg (196 lb)   SpO2 99%   BMI 24.83 kg/m²     Physical Exam  General appearance: Vital signs have been reviewed.  Comfortable.  Well-nourished and well-developed.He is alert and oriented x3 and appears his stated age.The patient is cooperative with exam.  Head: Hair pattern reveals a normal pattern for patient's age and face shows no abnormalities.  Eyes: PERRLA x2, EOMI x2, conjunctive a and sclera are clear.  Ears: " External bilateral ears with normal helix, tragus and earlobe.Bilateral canals are normal.Bilateral tympanic membranes are pearly gray and landmarks are well visualized.  Nose: Nasal mucosa both nostrils reveals no polyps, ulcerations or lesions.  Throat:Teeth are in good repair.  Posterior pharynx reveals no abnormalities.  Neck: Supple without lymphadenopathy, thyromegaly or carotid bruits.  Lungs:Clear to auscultation bilaterally with no wheezes, rales or rhonchi.  Cardiovascular: RRR without MRG.No carotid bruits.  Extremities without edema and pulses are intact.  Abdomen: Soft, NT, no masses, no hepatosplenomegaly.  Genitalia: No testicular masses.  No evidence of inguinal hernia.Nontender.  Rectal: No masses.  Prostate is normal in size and shape with no nodules. It is nontender.  Musculoskeletal: 5/5 and equal strength in bilateral upper and lower extremities.  Skin: Good turgor and without rashes.  Neurological: Good overall strength and no focal deficits.  Cranial nerves II through XII are grossly intact.  Psychiatric: Patient has appropriate judgment with good insight.  Mood is appropriate.    Assessment/Plan   Assessment & Plan  Essential (primary) hypertension  Continue lisinopril and hydrochlorothiazide.  Will return for fasting [facility] blood work including CMP.  If stable continue current medication recheck with me in 6 months.         Obstructive sleep apnea  Continue CPAP therapy.  Recheck in 6 months.         Well adult exam  Anticipatory guidance given.  He has had inguinal hernia symptoms over the past few years and now he is ready for referral.  He is going to call me when he wants to get this sent in.  Will refer to either Dr. Isabel or  Dr. Power.

## 2025-04-08 LAB
ALBUMIN SERPL-MCNC: 4.5 G/DL (ref 3.6–5.1)
ALBUMIN/CREAT UR: 5 MG/G CREAT
ALP SERPL-CCNC: 39 U/L (ref 35–144)
ALT SERPL-CCNC: 13 U/L (ref 9–46)
ANION GAP SERPL CALCULATED.4IONS-SCNC: 9 MMOL/L (CALC) (ref 7–17)
APPEARANCE UR: CLEAR
AST SERPL-CCNC: 14 U/L (ref 10–35)
BASOPHILS # BLD AUTO: 20 CELLS/UL (ref 0–200)
BASOPHILS NFR BLD AUTO: 0.4 %
BILIRUB SERPL-MCNC: 0.5 MG/DL (ref 0.2–1.2)
BILIRUB UR QL STRIP: NEGATIVE
BUN SERPL-MCNC: 21 MG/DL (ref 7–25)
CALCIUM SERPL-MCNC: 9.4 MG/DL (ref 8.6–10.3)
CHLORIDE SERPL-SCNC: 104 MMOL/L (ref 98–110)
CHOLEST SERPL-MCNC: 161 MG/DL
CHOLEST/HDLC SERPL: 3.7 (CALC)
CO2 SERPL-SCNC: 27 MMOL/L (ref 20–32)
COLOR UR: NORMAL
CREAT SERPL-MCNC: 0.91 MG/DL (ref 0.7–1.3)
CREAT UR-MCNC: 190 MG/DL (ref 20–320)
EGFRCR SERPLBLD CKD-EPI 2021: 101 ML/MIN/1.73M2
EOSINOPHIL # BLD AUTO: 140 CELLS/UL (ref 15–500)
EOSINOPHIL NFR BLD AUTO: 2.8 %
ERYTHROCYTE [DISTWIDTH] IN BLOOD BY AUTOMATED COUNT: 12.9 % (ref 11–15)
EST. AVERAGE GLUCOSE BLD GHB EST-MCNC: 117 MG/DL
EST. AVERAGE GLUCOSE BLD GHB EST-SCNC: 6.5 MMOL/L
GLUCOSE SERPL-MCNC: 110 MG/DL (ref 65–99)
GLUCOSE UR QL STRIP: NEGATIVE
HBA1C MFR BLD: 5.7 % OF TOTAL HGB
HCT VFR BLD AUTO: 44.2 % (ref 38.5–50)
HDLC SERPL-MCNC: 44 MG/DL
HGB BLD-MCNC: 14.6 G/DL (ref 13.2–17.1)
HGB UR QL STRIP: NEGATIVE
KETONES UR QL STRIP: NEGATIVE
LDLC SERPL CALC-MCNC: 101 MG/DL (CALC)
LEUKOCYTE ESTERASE UR QL STRIP: NEGATIVE
LYMPHOCYTES # BLD AUTO: 1750 CELLS/UL (ref 850–3900)
LYMPHOCYTES NFR BLD AUTO: 35 %
MCH RBC QN AUTO: 28.6 PG (ref 27–33)
MCHC RBC AUTO-ENTMCNC: 33 G/DL (ref 32–36)
MCV RBC AUTO: 86.5 FL (ref 80–100)
MICROALBUMIN UR-MCNC: 1 MG/DL
MONOCYTES # BLD AUTO: 340 CELLS/UL (ref 200–950)
MONOCYTES NFR BLD AUTO: 6.8 %
NEUTROPHILS # BLD AUTO: 2750 CELLS/UL (ref 1500–7800)
NEUTROPHILS NFR BLD AUTO: 55 %
NITRITE UR QL STRIP: NEGATIVE
NONHDLC SERPL-MCNC: 117 MG/DL (CALC)
PH UR STRIP: 5.5 [PH] (ref 5–8)
PLATELET # BLD AUTO: 218 THOUSAND/UL (ref 140–400)
PMV BLD REES-ECKER: 10.3 FL (ref 7.5–12.5)
POTASSIUM SERPL-SCNC: 3.9 MMOL/L (ref 3.5–5.3)
PROT SERPL-MCNC: 6.9 G/DL (ref 6.1–8.1)
PROT UR QL STRIP: NEGATIVE
RBC # BLD AUTO: 5.11 MILLION/UL (ref 4.2–5.8)
SODIUM SERPL-SCNC: 140 MMOL/L (ref 135–146)
SP GR UR STRIP: 1.03 (ref 1–1.03)
TRIGL SERPL-MCNC: 72 MG/DL
WBC # BLD AUTO: 5 THOUSAND/UL (ref 3.8–10.8)

## 2025-04-19 DIAGNOSIS — I10 ESSENTIAL (PRIMARY) HYPERTENSION: ICD-10-CM

## 2025-04-21 RX ORDER — HYDROCHLOROTHIAZIDE 25 MG/1
25 TABLET ORAL DAILY
Qty: 30 TABLET | Refills: 0 | Status: SHIPPED | OUTPATIENT
Start: 2025-04-21

## 2025-05-11 DIAGNOSIS — I10 ESSENTIAL (PRIMARY) HYPERTENSION: ICD-10-CM

## 2025-05-12 RX ORDER — HYDROCHLOROTHIAZIDE 25 MG/1
25 TABLET ORAL DAILY
Qty: 30 TABLET | Refills: 3 | Status: SHIPPED | OUTPATIENT
Start: 2025-05-12

## 2025-05-12 RX ORDER — LISINOPRIL 10 MG/1
10 TABLET ORAL DAILY
Qty: 90 TABLET | Refills: 2 | Status: SHIPPED | OUTPATIENT
Start: 2025-05-12

## 2025-06-05 ENCOUNTER — TELEPHONE (OUTPATIENT)
Dept: PRIMARY CARE | Facility: CLINIC | Age: 55
End: 2025-06-05
Payer: COMMERCIAL

## 2025-06-05 NOTE — TELEPHONE ENCOUNTER
I called the patient and reviewed blood work with him.  He is going to keep his appointment with me in a couple of months.

## 2025-10-07 ENCOUNTER — APPOINTMENT (OUTPATIENT)
Dept: PRIMARY CARE | Facility: CLINIC | Age: 55
End: 2025-10-07
Payer: COMMERCIAL